# Patient Record
Sex: MALE | Race: WHITE | NOT HISPANIC OR LATINO | Employment: FULL TIME | URBAN - METROPOLITAN AREA
[De-identification: names, ages, dates, MRNs, and addresses within clinical notes are randomized per-mention and may not be internally consistent; named-entity substitution may affect disease eponyms.]

---

## 2020-10-13 ENCOUNTER — OCCUPATIONAL HEALTH (OUTPATIENT)
Dept: URGENT CARE | Facility: CLINIC | Age: 40
End: 2020-10-13

## 2020-10-13 DIAGNOSIS — Z02.1 ENCOUNTER FOR PRE-EMPLOYMENT HEALTH SCREENING EXAMINATION: Primary | ICD-10-CM

## 2020-10-13 DIAGNOSIS — Z00.00 PHYSICAL EXAM: ICD-10-CM

## 2020-10-13 DIAGNOSIS — Z02.83 ENCOUNTER FOR DRUG SCREENING: ICD-10-CM

## 2020-10-13 LAB
CTP QC/QA: YES
POC 10 PANEL DRUG SCREEN: ABNORMAL

## 2020-10-13 PROCEDURE — 99499 UNLISTED E&M SERVICE: CPT | Mod: S$GLB,,, | Performed by: PHYSICIAN ASSISTANT

## 2020-10-13 PROCEDURE — 80305 DRUG TEST PRSMV DIR OPT OBS: CPT | Mod: QW,S$GLB,, | Performed by: PHYSICIAN ASSISTANT

## 2020-10-13 PROCEDURE — 80305 POCT RAPID DRUG SCREEN 10 PANEL: ICD-10-PCS | Mod: QW,S$GLB,, | Performed by: PHYSICIAN ASSISTANT

## 2020-10-13 PROCEDURE — 80305 DRUG TEST PRSMV DIR OPT OBS: CPT | Mod: S$GLB,,, | Performed by: PHYSICIAN ASSISTANT

## 2020-10-13 PROCEDURE — 99499 PHYSICAL, BASIC COMPLEXITY: ICD-10-PCS | Mod: S$GLB,,, | Performed by: PHYSICIAN ASSISTANT

## 2020-10-13 RX ORDER — BUPROPION HYDROCHLORIDE 300 MG/1
TABLET ORAL
COMMUNITY
Start: 2020-08-26 | End: 2021-08-05

## 2020-10-13 RX ORDER — METOPROLOL SUCCINATE 25 MG/1
TABLET, EXTENDED RELEASE ORAL
COMMUNITY
Start: 2020-07-06 | End: 2021-08-05

## 2020-10-13 RX ORDER — TRAMADOL HYDROCHLORIDE AND ACETAMINOPHEN 37.5; 325 MG/1; MG/1
TABLET, FILM COATED ORAL
COMMUNITY
Start: 2020-07-06 | End: 2021-08-05

## 2020-10-13 RX ORDER — MIRTAZAPINE 15 MG/1
TABLET, FILM COATED ORAL
COMMUNITY
Start: 2020-08-20 | End: 2021-08-05

## 2021-02-26 ENCOUNTER — HOSPITAL ENCOUNTER (EMERGENCY)
Facility: HOSPITAL | Age: 41
Discharge: LEFT BEFORE DISCHARGE | End: 2021-02-26
Payer: MEDICAID

## 2021-02-26 VITALS
HEIGHT: 73 IN | HEART RATE: 102 BPM | RESPIRATION RATE: 18 BRPM | DIASTOLIC BLOOD PRESSURE: 83 MMHG | SYSTOLIC BLOOD PRESSURE: 180 MMHG | TEMPERATURE: 98 F | OXYGEN SATURATION: 96 % | BODY MASS INDEX: 35.78 KG/M2 | WEIGHT: 270 LBS

## 2021-02-26 PROCEDURE — 99283 EMERGENCY DEPT VISIT LOW MDM: CPT | Mod: 25

## 2021-05-12 ENCOUNTER — PATIENT MESSAGE (OUTPATIENT)
Dept: RESEARCH | Facility: HOSPITAL | Age: 41
End: 2021-05-12

## 2024-06-20 ENCOUNTER — HOSPITAL ENCOUNTER (EMERGENCY)
Facility: HOSPITAL | Age: 44
Discharge: HOME | End: 2024-06-20
Attending: EMERGENCY MEDICINE
Payer: MEDICAID

## 2024-06-20 VITALS
HEIGHT: 73 IN | HEART RATE: 80 BPM | SYSTOLIC BLOOD PRESSURE: 141 MMHG | WEIGHT: 270 LBS | BODY MASS INDEX: 35.78 KG/M2 | DIASTOLIC BLOOD PRESSURE: 90 MMHG | RESPIRATION RATE: 18 BRPM | TEMPERATURE: 98 F | OXYGEN SATURATION: 98 %

## 2024-06-20 DIAGNOSIS — M54.12 CERVICAL RADICULOPATHY: Primary | ICD-10-CM

## 2024-06-20 DIAGNOSIS — R07.9 CHEST PAIN: ICD-10-CM

## 2024-06-20 LAB
ALBUMIN SERPL BCP-MCNC: 4.3 G/DL (ref 3.5–5.2)
ALP SERPL-CCNC: 54 U/L (ref 55–135)
ALT SERPL W/O P-5'-P-CCNC: 31 U/L (ref 10–44)
ANION GAP SERPL CALC-SCNC: 7 MMOL/L (ref 8–16)
AST SERPL-CCNC: 23 U/L (ref 10–40)
BASOPHILS # BLD AUTO: 0.1 K/UL (ref 0–0.2)
BASOPHILS NFR BLD: 1 % (ref 0–1.9)
BILIRUB SERPL-MCNC: 0.5 MG/DL (ref 0.1–1)
BNP SERPL-MCNC: 31 PG/ML (ref 0–99)
BUN SERPL-MCNC: 15 MG/DL (ref 6–20)
CALCIUM SERPL-MCNC: 9.6 MG/DL (ref 8.7–10.5)
CHLORIDE SERPL-SCNC: 106 MMOL/L (ref 95–110)
CO2 SERPL-SCNC: 24 MMOL/L (ref 23–29)
CREAT SERPL-MCNC: 1.1 MG/DL (ref 0.5–1.4)
DIFFERENTIAL METHOD BLD: NORMAL
EOSINOPHIL # BLD AUTO: 0.4 K/UL (ref 0–0.5)
EOSINOPHIL NFR BLD: 3.4 % (ref 0–8)
ERYTHROCYTE [DISTWIDTH] IN BLOOD BY AUTOMATED COUNT: 13.5 % (ref 11.5–14.5)
EST. GFR  (NO RACE VARIABLE): >60 ML/MIN/1.73 M^2
GLUCOSE SERPL-MCNC: 147 MG/DL (ref 70–110)
HCT VFR BLD AUTO: 47.1 % (ref 40–54)
HGB BLD-MCNC: 16 G/DL (ref 14–18)
IMM GRANULOCYTES # BLD AUTO: 0.04 K/UL (ref 0–0.04)
IMM GRANULOCYTES NFR BLD AUTO: 0.4 % (ref 0–0.5)
LYMPHOCYTES # BLD AUTO: 3 K/UL (ref 1–4.8)
LYMPHOCYTES NFR BLD: 29.6 % (ref 18–48)
MAGNESIUM SERPL-MCNC: 1.9 MG/DL (ref 1.6–2.6)
MCH RBC QN AUTO: 30.2 PG (ref 27–31)
MCHC RBC AUTO-ENTMCNC: 34 G/DL (ref 32–36)
MCV RBC AUTO: 89 FL (ref 82–98)
MONOCYTES # BLD AUTO: 0.8 K/UL (ref 0.3–1)
MONOCYTES NFR BLD: 8.2 % (ref 4–15)
NEUTROPHILS # BLD AUTO: 5.9 K/UL (ref 1.8–7.7)
NEUTROPHILS NFR BLD: 57.4 % (ref 38–73)
NRBC BLD-RTO: 0 /100 WBC
PLATELET # BLD AUTO: 233 K/UL (ref 150–450)
PMV BLD AUTO: 10.4 FL (ref 9.2–12.9)
POTASSIUM SERPL-SCNC: 4.4 MMOL/L (ref 3.5–5.1)
PROT SERPL-MCNC: 7.7 G/DL (ref 6–8.4)
RBC # BLD AUTO: 5.29 M/UL (ref 4.6–6.2)
SODIUM SERPL-SCNC: 137 MMOL/L (ref 136–145)
TROPONIN I SERPL HS-MCNC: 6.2 PG/ML (ref 0–14.9)
TROPONIN I SERPL HS-MCNC: 6.9 PG/ML (ref 0–14.9)
WBC # BLD AUTO: 10.25 K/UL (ref 3.9–12.7)

## 2024-06-20 PROCEDURE — 99285 EMERGENCY DEPT VISIT HI MDM: CPT | Mod: 25

## 2024-06-20 PROCEDURE — 85025 COMPLETE CBC W/AUTO DIFF WBC: CPT

## 2024-06-20 PROCEDURE — 25500020 PHARM REV CODE 255: Performed by: EMERGENCY MEDICINE

## 2024-06-20 PROCEDURE — 83735 ASSAY OF MAGNESIUM: CPT

## 2024-06-20 PROCEDURE — 80053 COMPREHEN METABOLIC PANEL: CPT

## 2024-06-20 PROCEDURE — 83880 ASSAY OF NATRIURETIC PEPTIDE: CPT

## 2024-06-20 PROCEDURE — 84484 ASSAY OF TROPONIN QUANT: CPT | Mod: 91

## 2024-06-20 PROCEDURE — 93005 ELECTROCARDIOGRAM TRACING: CPT | Performed by: INTERNAL MEDICINE

## 2024-06-20 PROCEDURE — 93010 ELECTROCARDIOGRAM REPORT: CPT | Mod: ,,, | Performed by: INTERNAL MEDICINE

## 2024-06-20 RX ORDER — METHOCARBAMOL 750 MG/1
1500 TABLET, FILM COATED ORAL 3 TIMES DAILY
Qty: 60 TABLET | Refills: 0 | Status: SHIPPED | OUTPATIENT
Start: 2024-06-20 | End: 2024-06-30

## 2024-06-20 RX ORDER — NAPROXEN 500 MG/1
500 TABLET ORAL 2 TIMES DAILY WITH MEALS
Qty: 60 TABLET | Refills: 0 | Status: SHIPPED | OUTPATIENT
Start: 2024-06-20

## 2024-06-20 RX ORDER — METHYLPREDNISOLONE 4 MG/1
TABLET ORAL
Qty: 1 EACH | Refills: 0 | Status: SHIPPED | OUTPATIENT
Start: 2024-06-20 | End: 2024-07-11

## 2024-06-20 RX ORDER — HYDROCODONE BITARTRATE AND ACETAMINOPHEN 5; 325 MG/1; MG/1
1 TABLET ORAL EVERY 6 HOURS PRN
Qty: 12 TABLET | Refills: 0 | Status: SHIPPED | OUTPATIENT
Start: 2024-06-20

## 2024-06-20 RX ADMIN — IOHEXOL 100 ML: 350 INJECTION, SOLUTION INTRAVENOUS at 01:06

## 2024-06-20 NOTE — ED PROVIDER NOTES
Encounter Date: 6/20/2024       History     Chief Complaint   Patient presents with    Chest Pain     L sided w/ radiation to neck x a few days.    Shortness of Breath     W/ metallic taste in mouth      HPI 44-year-old man who presents emergency department with complaints left-sided arm numbness worsening since Saturday with associated left upper extremity weakness worsening since Monday.  He also has been experiencing extreme fatigue since Saturday and endorses metallic taste in his mouth as well as shortness of breath.  He complains of neck pain worse on the left and denies any fever.  No trauma.  Review of patient's allergies indicates:  No Known Allergies  Past Medical History:   Diagnosis Date    HTN (hypertension)      No past surgical history on file.  No family history on file.  Social History     Tobacco Use    Smoking status: Every Day     Types: Cigars    Smokeless tobacco: Never    Tobacco comments:     6 per day   Substance Use Topics    Alcohol use: Never    Drug use: Never     Review of Systems   Constitutional:  Negative for fever.   HENT:  Negative for sore throat.         Change in taste.   Respiratory:  Positive for shortness of breath.    Cardiovascular:  Negative for chest pain.   Gastrointestinal:  Negative for nausea.   Genitourinary:  Negative for dysuria.   Musculoskeletal:  Positive for neck pain. Negative for back pain.   Skin:  Negative for rash.   Neurological:  Positive for weakness and numbness.   Hematological:  Does not bruise/bleed easily.       Physical Exam     Initial Vitals [06/20/24 0952]   BP Pulse Resp Temp SpO2   (!) 167/111 99 20 97.8 °F (36.6 °C) 97 %      MAP       --         Physical Exam    Constitutional: Vital signs are normal. He appears well-developed and well-nourished.  Non-toxic appearance. No distress.   HENT:   Head: Normocephalic and atraumatic.   Eyes: EOM are normal. Pupils are equal, round, and reactive to light.   Visual fields intact.   Neck: Neck  supple. No JVD present.       Normal range of motion.  Cardiovascular:  Normal rate, regular rhythm, normal heart sounds and intact distal pulses.     Exam reveals no gallop and no friction rub.       No murmur heard.  Pulmonary/Chest: Breath sounds normal. He has no wheezes. He has no rhonchi. He has no rales.   Abdominal: Abdomen is soft. Bowel sounds are normal. There is no abdominal tenderness. There is no rebound and no guarding.   Musculoskeletal:         General: Normal range of motion.      Cervical back: Normal range of motion and neck supple. No rigidity. Spinous process tenderness and muscular tenderness present.     Neurological: He is alert and oriented to person, place, and time. He has normal reflexes. A sensory deficit (Numbness in the left hand and left upper arm.) is present. No cranial nerve deficit. He exhibits normal muscle tone. Coordination normal. GCS eye subscore is 4. GCS verbal subscore is 5. GCS motor subscore is 6.   4/5 strength in left elbow flexion, extension and left hand  strength compared to the right.   Skin: Skin is warm and dry.   Psychiatric: He has a normal mood and affect. His speech is normal and behavior is normal. He is not actively hallucinating.         ED Course   Procedures  Labs Reviewed   COMPREHENSIVE METABOLIC PANEL - Abnormal; Notable for the following components:       Result Value    Glucose 147 (*)     Alkaline Phosphatase 54 (*)     Anion Gap 7 (*)     All other components within normal limits   MAGNESIUM   CBC W/ AUTO DIFFERENTIAL   TROPONIN I HIGH SENSITIVITY   B-TYPE NATRIURETIC PEPTIDE        ECG Results              EKG 12-lead (In process)        Collection Time Result Time QRS Duration [facility] QTC Calculation    06/20/24 09:59:22 06/20/24 10:08:45 100 421                     In process by Interface, Lab In [facility] (06/20/24 10:08:53)                   Narrative:    Test Reason : R07.9,    Vent. Rate : 087 BPM     Atrial Rate : 087 BPM     P-R Int : 120  ms          QRS Dur : 100 ms      QT Int : 350 ms       P-R-T Axes : 083 075 078 degrees     QTc Int : 421 ms    Normal sinus rhythm with sinus arrhythmia  Incomplete right bundle branch block  Borderline Abnormal ECG  When compared with ECG of 26-JUN-2020 10:18,  Incomplete right bundle branch block is now Present    Referred By:             Confirmed By:                                   Imaging Results              X-Ray Chest AP Portable (Final result)  Result time 06/20/24 10:27:23      Final result by Gallo Sifuentes MD (06/20/24 10:27:23)                   Impression:      No acute cardiac or pulmonary process.      Electronically signed by: Gallo Sifuentes  Date:    06/20/2024  Time:    10:27               Narrative:    CLINICAL HISTORY:  ([id number])43 y/o  (1980) M    Chest Pain;    TECHNIQUE:  (A#[id number], exam time 6/20/2024 10:23)    XR CHEST AP PORTABLE [id number]    COMPARISON:  Radiograph from 06/24/2020    FINDINGS:  The lungs are clear. Costophrenic angles are seen without effusion. No pneumothorax is identified. The heart is normal in size. The mediastinum is within normal limits. Osseous structures show degenerative changes in the spine. The visualized upper abdomen is unremarkable.                                       Medications - No data to display  Medical Decision Making  Amount and/or Complexity of Data Reviewed  Radiology: ordered.                                      Clinical Impression:  Final diagnoses:  [R07.9] Chest pain                 Garret Toussaint MD  06/25/24 2216

## 2024-06-22 LAB
OHS QRS DURATION: 100 MS
OHS QTC CALCULATION: 421 MS

## 2024-07-22 PROBLEM — M48.02 MYELOPATHY CONCURRENT WITH AND DUE TO SPINAL STENOSIS OF CERVICAL REGION: Status: ACTIVE | Noted: 2024-07-22

## 2024-07-22 PROBLEM — L30.9 ECZEMA: Status: ACTIVE | Noted: 2024-07-22

## 2024-07-22 PROBLEM — N20.0 KIDNEY CALCULI: Status: ACTIVE | Noted: 2024-07-22

## 2024-07-22 PROBLEM — Z72.0 SMOKING TRYING TO QUIT: Status: ACTIVE | Noted: 2024-07-22

## 2024-07-22 PROBLEM — G99.2 MYELOPATHY CONCURRENT WITH AND DUE TO SPINAL STENOSIS OF CERVICAL REGION: Status: ACTIVE | Noted: 2024-07-22

## 2024-07-22 PROBLEM — D13.91 POLYPOSIS COLI: Status: ACTIVE | Noted: 2024-07-22

## 2024-07-22 PROBLEM — R03.0 ELEVATED BP WITHOUT DIAGNOSIS OF HYPERTENSION: Status: ACTIVE | Noted: 2024-07-22

## 2024-10-08 PROBLEM — M79.10 MYALGIA: Status: ACTIVE | Noted: 2024-10-08

## 2024-12-27 ENCOUNTER — CLINICAL SUPPORT (OUTPATIENT)
Dept: REHABILITATION | Facility: HOSPITAL | Age: 44
End: 2024-12-27
Attending: INTERNAL MEDICINE
Payer: MEDICAID

## 2024-12-27 DIAGNOSIS — M25.672 ANKLE STIFFNESS, LEFT: Primary | ICD-10-CM

## 2024-12-27 DIAGNOSIS — G89.29 CHRONIC BILATERAL LOW BACK PAIN WITHOUT SCIATICA: ICD-10-CM

## 2024-12-27 DIAGNOSIS — M54.2 NECK PAIN: ICD-10-CM

## 2024-12-27 DIAGNOSIS — M54.50 CHRONIC BILATERAL LOW BACK PAIN WITHOUT SCIATICA: ICD-10-CM

## 2024-12-27 DIAGNOSIS — M79.10 MYALGIA: ICD-10-CM

## 2024-12-27 PROCEDURE — 97162 PT EVAL MOD COMPLEX 30 MIN: CPT | Mod: PN

## 2024-12-27 NOTE — PROGRESS NOTES
[facility]  Physical Therapy Initial Evaluation / Plan Of Care    Name: Sixto Denis  Clinic Number: [id number]    Therapy Diagnosis:   Encounter Diagnosis   Name Primary?    Myalgia      Physician: Jakub Barker MD    Physician Orders: PT Eval and Treat   Medical Diagnosis: M79.10 (ICD-10-CM) - Myalgia  Evaluation Date: 12/27/2024  Authorization period Expiration: 12/31/2025  Plan of Care Expiration: 3/14/2025  Progress Note Due: 2/7/2025  Visit #/Visits authorized: 1/ 1   FOTO: 1/ 3       Precautions: Standard  Date of Surgery: NA      Time In: 1130  Time Out: 1215  Total Appointment Time: 45 minutes  Total Billable Time: 45 minutes  SUBJECTIVE       Past Medical History:   Diagnosis Date    Bulging lumbar disc     HTN (hypertension)     Kidney stones      Sixto Denis  has a past surgical history that includes Colonoscopy w/ biopsies and polypectomy (11/2020).    Sixto has a current medication list which includes the following prescription(s): alprazolam, buspirone, hydrocodone-acetaminophen, hydrocortisone, methocarbamol, mometasone 0.1%, naproxen, ondansetron, and tamsulosin.    Review of patient's allergies indicates:  No Known Allergies     Prior Therapy: yes for current condition, in [place] but fell off the schedule   Social History: Patient lives in a single story home with 0 steps to enter   Occupation:    Prior Level of Function: independent with pain  Current Level of Function: same    Pain:  Current 7/10, worst 9/10, best 4/10   Location: lower cervical to upper thoracic with radiating symptoms into both shoulders, right more than left  Description: Aching, Tingling, and Sharp  Aggravating Factors: weather, climbing ladders, overhead reaching  Easing Factors:  attempted rest, positional changes, ice, heat, topicals, and Rx meds with no relief       Onset/COLLEEN: thinks it stems from an old ankle injury and/or chiropractic adjustment    History of current condition -  Sixto reports: 1 year history of cervical pain following chiropractic adjustment. Patient states about 7 years ago he fell from scaffolding and fractured the left ankle. He states he had no surgery and only 1 x-ray following the ankle injury. Says he's never had treatment on the ankle and he feels like all of his issues are stemming from his ankle. He says he did have therapy in [place] recently to address the neck, but states they wouldn't do anything else other than the neck. He says he did have dry needling and traction to alleviate neck pain and is interested in continuing that treatment if indicated.     Pts goals: to improve quality of mobility    OBJECTIVE     Cervical Range of Motion:    Degrees Pain   Flexion 19    Extension 50    Left Rotation 50    Right Rotation 40    Left Side Bending 21    Right Side Bending 15       Shoulder Range of Motion:   Left:  WFL   Right: WFL, limited functional IR to posterolateral right hip    Positive Cervical compression test bilaterally    Strength:   Left Right   DNF 3/5    Upper trap 5/5 4/5   Mid trap 4/5 4/5   Lower trap 4/5 4/5    105 105     Upper Extremity Strength   Left Right   Shoulder flexion: 5/5 5/5   Shoulder Abduction: 5/5 5/5   Shoulder ER 5/5 5/5   Shoulder IR 5/5 5/5   Elbow flexion: 5/5 5/5   Elbow extension: 5/5 5/5   Wrist flexion: 5/5 5/5   Wrist extension: 5/5 5/5       Lumbar Range of Motion:    % of Normal Range Pain   Flexion 100    Extension 50    Left Side Bending 100    Right Side Bending 100    Left rotation 100    Right Rotation 100       Lower Extremity Strength   Left Right   Knee extension: 5/5 5/5   Knee flexion: 5/5 5/5   Hip flexion: 4/5 4/5   Hip extension:  4/5 4/5   Hip abduction: 4+/5 4+/5   Hip adduction: 5/5 5/5   Ankle dorsiflexion: 5/5 5/5     Special Tests:    Straight Leg Raise Negative   Slump Negative   Quadrant Negative   Femoral nerve test Negative       Ankle Range of Motion: AROM   Ankle Left Right   Dorsiflexion  0 15   Plantarflexion 40 45   Inversion 18 30   Eversion 8 20     Ankle Range of Motion: PROM   Ankle Left   Dorsiflexion 7   Plantarflexion 45   Inversion 23   Eversion 11     Strength:  Ankle Left Right   Dorsiflexion 5/5 5/5   Plantarflexion 3-/5 4/5   Inversion 3-/5 5/5   Eversion 3-/5 5/5         Treatment     EVAL ONLY THIS DATE      Patient Education and Home Exercises     Home Exercises and Patient Education Provided    Education provided re:   - progress towards goals   - role of therapy in multi - disciplinary team, goals for therapy  Pt educated on condition, POC, and expectations in therapy.  No spiritual or educational barriers to learning provided    Home exercises:  Pt will be provided HEP during course of treatment with progressions as appropriate. Pt was advised to perform these exercises free of pain, and to stop performing them if pain occurs.   Sixto demonstrated good  understanding of the education provided.     Functional Limitations Reports  Tool: FOTO  Intake: 47  Predicted: 56    ASSESSMENT      Sixto is a 44 y.o. male referred to outpatient physical therapy with a medical diagnosis of M79.10 (ICD-10-CM) - Myalgia, and presents to PT with several complaints today. Patient does have decreased A/PROM in the left ankle as well as decreased strength associated. MD is ordering an X-ray of the ankle as this is an old injury that may have some natural fusions limiting range. Patient's lumbar range is functional and his bilateral hip and knee strength is within functional limits with no neurotension noted during examination. Cervical range is limited in all planes and patient does present with positive compression tests indicating likely nerve or disc involvement. Patient to benefit from therapy with a focus first on the cervical spine and incorporating left ankle as appropriate pending imaging. Patient to benefit from skilled therapy to prevent further decline in functional status. All questions  and concerns addressed.  Patient demonstrates limitations as described in the problem list. Pt will benefit from physcial therapy services in order to maximize pain free mobility of cervical spine and bilateral upper extremities as well as maximize function in left ankle to improve quality of gait. The following goals were discussed with the patient and patient is in agreement with them as to be addressed in the treatment plan.     Pt prognosis is Good.     Pt's spiritual, cultural and educational needs considered and pt agreeable to plan of care and goals as stated below:     Anticipated Barriers for therapy: none identified at time of evaluation for cervical spine, chronicity of left ankle symptoms    Plan of care discussed with patient: Yes    Pt will benefit from skilled outpatient Physical Therapy to address the deficits stated above and in the chart below, provide pt/family education, and to maximize pt's level of independence.     Medical necessity is demonstrated by the following IMPAIRMENTS/PROBLEM LIST:    weakness, impaired functional mobility, gait instability, decreased upper extremity function, decreased lower extremity function, pain, decreased ROM, impaired joint extensibility, and impaired muscle length      Medical Necessity is demonstrated by the following  History  Co-morbidities and personal factors that may impact the plan of care Co-morbidities/Personal Factors    0= low, 1-2 = mod, 3+ = high   high   Examination  Body Structures and Functions, activity limitations and participation restrictions that may impact the plan of care Body Regions/Body Systems    Participation Restrictions    Activity limitations  Mobility/Self care/Domestic Life/Community and Social Life    1-2 = low, 3+ = mod, 4+ = high       moderate   Clinical Presentation evolving clinical presentation with changing clinical characteristics  Stable/uncomplicated = low, evolving/changing = mod,  Unstable/unpredictable = high  moderate   Decision Making/ Complexity Score: moderate           GOALS     Short Term Goals: 3 weeks  Demonstrate improvement in recent symptoms to progress toward long term goals  Correct postural deviations in sitting and standing to decrease pain and promote postural awareness for injury prevention.  Improve AROM of cervical spine to 50 deg rotation bilaterally, 50 deg extension, 50 deg flexion or better  50% decrease in radicular symptoms in upper extremities coming from the cervical spine  Demonstrate compliance with initial exercise program    Long Term Goals: 6 weeks  Perform all dressing, grooming, and hygiene tasks without limitation from neck or arm pain  Perform 75% of work duties with minimal to no limitation due to neck or arm pain, no work task greater than moderate limitation  Perform all usual household and yard tasks with minimal to no limitation  Ascend/descend flight of stairs with no limitation, reciprocal pattern, minimal compensation due to left ankle  Ascend/descend ladder with minimal limitation due to left ankle  Demonstrate independence with home exercise program to maintain gains made in therapy.      PLAN      Certification Period: 12/27/2024 to 3/14/2025.    Outpatient Physical Therapy 2 times weekly for 6 weeks to include the following interventions: patient education, Cervical/Lumbar Traction, Electrical Stimulation PRN, Gait Training, Manual Therapy, Moist Heat/ Ice, Neuromuscular Re-ed, Patient Education, Self Care, Therapeutic Activities, Therapeutic Exercise, Ultrasound, and dry needling .   Pt may be seen by PTA as part of the rehabilitation team.     I certify the need for these services furnished under this plan of treatment and while under my care.    Melanie Curry, PT        Attestation:   I have seen the patient, reviewed the therapist's plan of care, and I agree with the plan of care.   I certify the need for these services furnished under this plan of treatment and while  under my care.         _______________            ________                                               _____________________  Physician/Referring Practitioner                                                            Date of Signature

## 2024-12-27 NOTE — PLAN OF CARE
[facility]  Physical Therapy Initial Evaluation / Plan Of Care    Name: Sixto Denis  Clinic Number: [id number]    Therapy Diagnosis:   Encounter Diagnosis   Name Primary?    Myalgia      Physician: Jakub Barker MD    Physician Orders: PT Eval and Treat   Medical Diagnosis: M79.10 (ICD-10-CM) - Myalgia  Evaluation Date: 12/27/2024  Authorization period Expiration: 12/31/2025  Plan of Care Expiration: 3/14/2025  Progress Note Due: 2/7/2025  Visit #/Visits authorized: 1/ 1   FOTO: 1/ 3       Precautions: Standard  Date of Surgery: NA      Time In: 1130  Time Out: 1215  Total Appointment Time: 45 minutes  Total Billable Time: 45 minutes  SUBJECTIVE       Past Medical History:   Diagnosis Date    Bulging lumbar disc     HTN (hypertension)     Kidney stones      Sixto Denis  has a past surgical history that includes Colonoscopy w/ biopsies and polypectomy (11/2020).    Sixto has a current medication list which includes the following prescription(s): alprazolam, buspirone, hydrocodone-acetaminophen, hydrocortisone, methocarbamol, mometasone 0.1%, naproxen, ondansetron, and tamsulosin.    Review of patient's allergies indicates:  No Known Allergies     Prior Therapy: yes for current condition, in [place] but fell off the schedule   Social History: Patient lives in a single story home with 0 steps to enter   Occupation:    Prior Level of Function: independent with pain  Current Level of Function: same    Pain:  Current 7/10, worst 9/10, best 4/10   Location: lower cervical to upper thoracic with radiating symptoms into both shoulders, right more than left  Description: Aching, Tingling, and Sharp  Aggravating Factors: weather, climbing ladders, overhead reaching  Easing Factors: attempted rest, positional changes, ice, heat, topicals, and Rx meds with no relief       Onset/COLLEEN: thinks it stems from an old ankle injury and/or chiropractic adjustment    History of current condition -  Sixto reports: 1 year history of cervical pain following chiropractic adjustment. Patient states about 7 years ago he fell from scaffolding and fractured the left ankle. He states he had no surgery and only 1 x-ray following the ankle injury. Says he's never had treatment on the ankle and he feels like all of his issues are stemming from his ankle. He says he did have therapy in [place] recently to address the neck, but states they wouldn't do anything else other than the neck. He says he did have dry needling and traction to alleviate neck pain and is interested in continuing that treatment if indicated.     Pts goals: to improve quality of mobility    OBJECTIVE     Cervical Range of Motion:    Degrees Pain   Flexion 19    Extension 50    Left Rotation 50    Right Rotation 40    Left Side Bending 21    Right Side Bending 15       Shoulder Range of Motion:   Left:  WFL   Right: WFL, limited functional IR to posterolateral right hip    Positive Cervical compression test bilaterally    Strength:   Left Right   DNF 3/5    Upper trap 5/5 4/5   Mid trap 4/5 4/5   Lower trap 4/5 4/5    105 105     Upper Extremity Strength   Left Right   Shoulder flexion: 5/5 5/5   Shoulder Abduction: 5/5 5/5   Shoulder ER 5/5 5/5   Shoulder IR 5/5 5/5   Elbow flexion: 5/5 5/5   Elbow extension: 5/5 5/5   Wrist flexion: 5/5 5/5   Wrist extension: 5/5 5/5       Lumbar Range of Motion:    % of Normal Range Pain   Flexion 100    Extension 50    Left Side Bending 100    Right Side Bending 100    Left rotation 100    Right Rotation 100       Lower Extremity Strength   Left Right   Knee extension: 5/5 5/5   Knee flexion: 5/5 5/5   Hip flexion: 4/5 4/5   Hip extension:  4/5 4/5   Hip abduction: 4+/5 4+/5   Hip adduction: 5/5 5/5   Ankle dorsiflexion: 5/5 5/5     Special Tests:    Straight Leg Raise Negative   Slump Negative   Quadrant Negative   Femoral nerve test Negative       Ankle Range of Motion: AROM   Ankle Left Right   Dorsiflexion  0 15   Plantarflexion 40 45   Inversion 18 30   Eversion 8 20     Ankle Range of Motion: PROM   Ankle Left   Dorsiflexion 7   Plantarflexion 45   Inversion 23   Eversion 11     Strength:  Ankle Left Right   Dorsiflexion 5/5 5/5   Plantarflexion 3-/5 4/5   Inversion 3-/5 5/5   Eversion 3-/5 5/5         Treatment     EVAL ONLY THIS DATE      Patient Education and Home Exercises     Home Exercises and Patient Education Provided    Education provided re:   - progress towards goals   - role of therapy in multi - disciplinary team, goals for therapy  Pt educated on condition, POC, and expectations in therapy.  No spiritual or educational barriers to learning provided    Home exercises:  Pt will be provided HEP during course of treatment with progressions as appropriate. Pt was advised to perform these exercises free of pain, and to stop performing them if pain occurs.   Sixto demonstrated good  understanding of the education provided.     Functional Limitations Reports  Tool: FOTO  Intake: 47  Predicted: 56    ASSESSMENT      Sixto is a 44 y.o. male referred to outpatient physical therapy with a medical diagnosis of M79.10 (ICD-10-CM) - Myalgia, and presents to PT with several complaints today. Patient does have decreased A/PROM in the left ankle as well as decreased strength associated. MD is ordering an X-ray of the ankle as this is an old injury that may have some natural fusions limiting range. Patient's lumbar range is functional and his bilateral hip and knee strength is within functional limits with no neurotension noted during examination. Cervical range is limited in all planes and patient does present with positive compression tests indicating likely nerve or disc involvement. Patient to benefit from therapy with a focus first on the cervical spine and incorporating left ankle as appropriate pending imaging. Patient to benefit from skilled therapy to prevent further decline in functional status. All questions  and concerns addressed.  Patient demonstrates limitations as described in the problem list. Pt will benefit from physcial therapy services in order to maximize pain free mobility of cervical spine and bilateral upper extremities as well as maximize function in left ankle to improve quality of gait. The following goals were discussed with the patient and patient is in agreement with them as to be addressed in the treatment plan.     Pt prognosis is Good.     Pt's spiritual, cultural and educational needs considered and pt agreeable to plan of care and goals as stated below:     Anticipated Barriers for therapy: none identified at time of evaluation for cervical spine, chronicity of left ankle symptoms    Plan of care discussed with patient: Yes    Pt will benefit from skilled outpatient Physical Therapy to address the deficits stated above and in the chart below, provide pt/family education, and to maximize pt's level of independence.     Medical necessity is demonstrated by the following IMPAIRMENTS/PROBLEM LIST:    weakness, impaired functional mobility, gait instability, decreased upper extremity function, decreased lower extremity function, pain, decreased ROM, impaired joint extensibility, and impaired muscle length      Medical Necessity is demonstrated by the following  History  Co-morbidities and personal factors that may impact the plan of care Co-morbidities/Personal Factors    0= low, 1-2 = mod, 3+ = high   high   Examination  Body Structures and Functions, activity limitations and participation restrictions that may impact the plan of care Body Regions/Body Systems    Participation Restrictions    Activity limitations  Mobility/Self care/Domestic Life/Community and Social Life    1-2 = low, 3+ = mod, 4+ = high       moderate   Clinical Presentation evolving clinical presentation with changing clinical characteristics  Stable/uncomplicated = low, evolving/changing = mod,  Unstable/unpredictable = high  moderate   Decision Making/ Complexity Score: moderate           GOALS     Short Term Goals: 3 weeks  Demonstrate improvement in recent symptoms to progress toward long term goals  Correct postural deviations in sitting and standing to decrease pain and promote postural awareness for injury prevention.  Improve AROM of cervical spine to 50 deg rotation bilaterally, 50 deg extension, 50 deg flexion or better  50% decrease in radicular symptoms in upper extremities coming from the cervical spine  Demonstrate compliance with initial exercise program    Long Term Goals: 6 weeks  Perform all dressing, grooming, and hygiene tasks without limitation from neck or arm pain  Perform 75% of work duties with minimal to no limitation due to neck or arm pain, no work task greater than moderate limitation  Perform all usual household and yard tasks with minimal to no limitation  Ascend/descend flight of stairs with no limitation, reciprocal pattern, minimal compensation due to left ankle  Ascend/descend ladder with minimal limitation due to left ankle  Demonstrate independence with home exercise program to maintain gains made in therapy.      PLAN      Certification Period: 12/27/2024 to 3/14/2025.    Outpatient Physical Therapy 2 times weekly for 6 weeks to include the following interventions: patient education, Cervical/Lumbar Traction, Electrical Stimulation PRN, Gait Training, Manual Therapy, Moist Heat/ Ice, Neuromuscular Re-ed, Patient Education, Self Care, Therapeutic Activities, Therapeutic Exercise, Ultrasound, and dry needling.   Pt may be seen by PTA as part of the rehabilitation team.     I certify the need for these services furnished under this plan of treatment and while under my care.    Melanie Curry, PT        Attestation:   I have seen the patient, reviewed the therapist's plan of care, and I agree with the plan of care.   I certify the need for these services furnished under this plan of treatment and while  under my care.         _______________            ________                                               _____________________  Physician/Referring Practitioner                                                            Date of Signature

## 2024-12-30 NOTE — PROGRESS NOTES
[facility]   Physical Therapy Treatment Note     Name: Sixto Denis  Clinic Number: [id number]    Therapy Diagnosis:   Encounter Diagnoses   Name Primary?    Ankle stiffness, left Yes    Chronic bilateral low back pain without sciatica     Neck pain      Physician: Jakub Barker MD    Visit Date: 12/31/2024    Physician Orders: PT Eval and Treat   Medical Diagnosis: M79.10 (ICD-10-CM) - Myalgia  Evaluation Date: 12/27/2024  Authorization period Expiration: 12/31/2025  Plan of Care Expiration: 3/14/2025  Progress Note Due: 2/7/2025  Visit #/Visits authorized: 2/ 1 pending  FOTO: 1/ 3         Precautions: Standard  Date of Surgery: NA    PTA Visit #: 0/5       Time In: 3:20  Time Out: 4:05  Total Billable Time: 45 minutes    SUBJECTIVE     Pt reports: CC is neck pain  .  Pt was not provided HEP   Response to previous treatment: felt ok   Functional change: none yet     Pain: 7/10 to start at end no c/o increased pain   Location: bilateral upper trap     OBJECTIVE     Cervical ROM: (measured in degrees sitting)  12-31-24  - flexion -  25                    - extension -  45                        - right side bending -  30        - left side bending -  30      - right rotation - 40  - left rotation - 30     Full shoulder ROM    Pt with slight scap instability with elevation and abd     Treatment     Sixto received the treatments listed below:      therapeutic exercises to develop strength, endurance, ROM, flexibility, posture, and core stabilization for 45 minutes including:  Cervical mob with side glide and P/A, grade 1-3, gentle distraction, suboccipital release  STM neck paraspinals, upper trap, levator scapulae, suboccipitals     Instructed pt in self STM to suboccipitals with tennis balls   \  CROM SB x 10 3 count hold  CROM rot x 10 3 count hold   Scap squeeze x 10   Pulldown with retraction with YTB x 10  Retraction with YTB x 10  ER with YTB x 10  Provided pt YTB for home  use    Patient Education and Home Exercises     Home Exercises Provided and Patient Education Provided     Education provided:   - posture awareness and need for scap stab  - HEP   - stretch to point of tightness not pain   - exercise in pain free zone       Written Home Exercises Provided: yes. Exercises were reviewed and Sixto was able to demonstrate them prior to the end of the session.  Sixto demonstrated good understanding of the education provided. See EMR under Patient Instructions for exercises provided during therapy sessions    ASSESSMENT     Pt with scap instability and will benefit from strengthening  pt appears to understand need to disassociate upper trap with shoulder movement  Pt tolerated treatment well and able to progress with strengthening and stabilization without adverse effect       Sixto Is progressing well towards his goals.     Pt prognosis is Good.   Rehab potential:  Good    Pt will continue to benefit from skilled outpatient physical therapy to address the goals listed in the initial evaluation, provide pt/family education and to maximize pt's level of independence in the home and community environment.     Pt's spiritual, cultural and educational needs considered and pt agreeable to plan of care and goals.     Anticipated barriers to physical therapy: chronicity of ankle    Short Term Goals: 3 weeks  Demonstrate improvement in recent symptoms to progress toward long term goals  Correct postural deviations in sitting and standing to decrease pain and promote postural awareness for injury prevention.  Improve AROM of cervical spine to 50 deg rotation bilaterally, 50 deg extension, 50 deg flexion or better  50% decrease in radicular symptoms in upper extremities coming from the cervical spine  Demonstrate compliance with initial exercise program     Long Term Goals: 6 weeks  Perform all dressing, grooming, and hygiene tasks without limitation from neck or arm pain  Perform 75% of work  duties with minimal to no limitation due to neck or arm pain, no work task greater than moderate limitation  Perform all usual household and yard tasks with minimal to no limitation  Ascend/descend flight of stairs with no limitation, reciprocal pattern, minimal compensation due to left ankle  Ascend/descend ladder with minimal limitation due to left ankle  Demonstrate independence with home exercise program to maintain gains made in therapy.        PLAN   Continue with established plan of care towards PT goals.      Progress strength and stab as tolerated      Brittany Campo, PT

## 2024-12-31 ENCOUNTER — HOSPITAL ENCOUNTER (OUTPATIENT)
Dept: RADIOLOGY | Facility: HOSPITAL | Age: 44
Discharge: HOME | End: 2024-12-31
Attending: INTERNAL MEDICINE
Payer: MEDICAID

## 2024-12-31 ENCOUNTER — CLINICAL SUPPORT (OUTPATIENT)
Dept: REHABILITATION | Facility: HOSPITAL | Age: 44
End: 2024-12-31
Attending: INTERNAL MEDICINE
Payer: MEDICAID

## 2024-12-31 DIAGNOSIS — G89.29 CHRONIC BILATERAL LOW BACK PAIN WITHOUT SCIATICA: ICD-10-CM

## 2024-12-31 DIAGNOSIS — M25.672 ANKLE STIFFNESS, LEFT: ICD-10-CM

## 2024-12-31 DIAGNOSIS — M54.50 CHRONIC BILATERAL LOW BACK PAIN WITHOUT SCIATICA: ICD-10-CM

## 2024-12-31 DIAGNOSIS — M25.672 ANKLE STIFFNESS, LEFT: Primary | ICD-10-CM

## 2024-12-31 DIAGNOSIS — M54.2 NECK PAIN: ICD-10-CM

## 2024-12-31 PROCEDURE — 97110 THERAPEUTIC EXERCISES: CPT | Mod: PN | Performed by: PHYSICAL THERAPIST

## 2024-12-31 PROCEDURE — 73610 X-RAY EXAM OF ANKLE: CPT | Mod: TC,FY,PO,LT

## 2024-12-31 PROCEDURE — 73610 X-RAY EXAM OF ANKLE: CPT | Mod: 26,LT,, | Performed by: STUDENT IN AN ORGANIZED HEALTH CARE EDUCATION/TRAINING PROGRAM
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
## 2025-01-28 ENCOUNTER — CLINICAL SUPPORT (OUTPATIENT)
Dept: REHABILITATION | Facility: HOSPITAL | Age: 45
End: 2025-01-28
Payer: MEDICAID

## 2025-01-28 DIAGNOSIS — M54.50 CHRONIC BILATERAL LOW BACK PAIN WITHOUT SCIATICA: ICD-10-CM

## 2025-01-28 DIAGNOSIS — M25.672 ANKLE STIFFNESS, LEFT: Primary | ICD-10-CM

## 2025-01-28 DIAGNOSIS — M54.12 CERVICAL RADICULOPATHY: ICD-10-CM

## 2025-01-28 DIAGNOSIS — M54.2 NECK PAIN: ICD-10-CM

## 2025-01-28 DIAGNOSIS — G89.29 CHRONIC BILATERAL LOW BACK PAIN WITHOUT SCIATICA: ICD-10-CM

## 2025-01-28 DIAGNOSIS — M79.10 MYALGIA: ICD-10-CM

## 2025-01-28 PROCEDURE — 97110 THERAPEUTIC EXERCISES: CPT | Mod: PN | Performed by: PHYSICAL THERAPIST

## 2025-01-29 NOTE — PROGRESS NOTES
"[facility]   Physical Therapy  Treatment Note     Name: Sixto Denis  Clinic Number: [id number]    Therapy Diagnosis:   Encounter Diagnoses   Name Primary?    Myalgia Yes    Ankle stiffness, left     Chronic bilateral low back pain without sciatica     Neck pain      Physician: Jakub Barker MD    Visit Date: 1/30/2025    Physician Orders: PT Eval and Treat   Medical Diagnosis: M79.10 (ICD-10-CM) - Myalgia  Evaluation Date: 12/27/2024  Authorization period Expiration: 3-1/2025  Plan of Care Expiration: 3/14/2025  Progress Note Due: 2/27/2025  Visit #/Visits authorized: 4/ 13?   FOTO: 1/ 3         Precautions: Standard  Date of Surgery: NA    PTA Visit #: 0/5     Time In: 9:08  Time Out: 10:05  Total Billable Time: 57 minutes    SUBJECTIVE     Pt reports: stretching really helps Pt states did a lot of work with a lot of crawling and dragging   Response to previous treatment: felt ok   Functional change: none yet     Pain: 4-5/10 to start at end  after neurofloss decreased tingle 3/10 after DN no tingling  Location: along upper trap at last session top of shoulders "joints" at IE bilateral upper trap     OBJECTIVE     Cervical ROM: (measured in degrees sitting) 1-28-25  - flexion -  30                    - extension -  35                        - right side bending -  25        - left side bending -  25      - right rotation - 45  - left rotation - 45      Cervical ROM: (measured in degrees sitting)  12-31-24  - flexion -  25                    - extension -  45                        - right side bending -  30        - left side bending -  30      - right rotation - 40  - left rotation - 30       Treatment     Sixto received the treatments listed below:      therapeutic exercises to develop strength, endurance, ROM, flexibility, posture, and core stabilization for 57 minutes including:    Cervical mob with side glide and P/A, grade 1-3, gentle distraction, suboccipital release  STM neck " paraspinals, upper trap, levator scapulae, suboccipitals     Instructed pt to continue  self STM to suboccipitals with tennis balls   Provided info on theracane for self STM   Further instructed pt regarding proper posture and concerns doing heavy work as a      CROM SB x 10 3 count hold  CROM rot x 10 3 count hold  after ROM ex increased 5 degrees SB and Rot 7  Scap squeeze x 10   Pulldown with retraction with YTB x 10  Retraction with YTB x 10  ER with YTB x 10   Protraction supine x 10 with 5#  did 5 at home and did well   Provided pt YTB for home use   Horizontal abd prone x 10 neutral      Ulnar/median Nerve gliding with tray exercise x 3/10      Patient  gave verbal consent to undergo dry needling.     Dry needling with trigger point/manual therapy techniques was performed to B upper trap,levator scap, and suboccipital.  All needles were removed and changes in signs and symptoms were decreased to 3/10 .  Dry needling was performed to decrease inflammation, increase circulation, decrease pain and restore homeostasis.       Cheko shoulder taping #1,2,3 was performed to provide support to scapula and shoulder to decrease pain and improve functional use of UE.  Instructed pt in use, care and precautions with tape.    Instructed pt in consideration of posture shirt and provided handout     Patient Education and Home Exercises     Home Exercises Provided and Patient Education Provided     Education provided:   - posture awareness and need for scap stab  - HEP   - stretch to point of tightness not pain   - exercise in pain free zone       Written Home Exercises Provided: yes. Exercises were reviewed and Sixto was able to demonstrate them prior to the end of the session.  Sixto demonstrated good understanding of the education provided. See EMR under Patient Instructions for exercises provided during therapy sessions    ASSESSMENT   Pt with less pain as compared to last session Pt may benefit from Cheko  taping and will note response  Pt tolerated treatment well and able to progress with strengthening and stabilization without adverse effect  and may benefit from neuroglides and use of theracane   Patient responded well to dry needling as noted above          Sixto Is progressing well towards his goals.     Pt prognosis is Good.   Rehab potential:  Good    Pt will continue to benefit from skilled outpatient physical therapy to address the goals listed in the initial evaluation, provide pt/family education and to maximize pt's level of independence in the home and community environment.     Pt's spiritual, cultural and educational needs considered and pt agreeable to plan of care and goals.     Anticipated barriers to physical therapy: chronicity of ankle    Short Term Goals: 3 weeks (Progressing, not met)  Demonstrate improvement in recent symptoms to progress toward long term goals  Correct postural deviations in sitting and standing to decrease pain and promote postural awareness for injury prevention.  Improve AROM of cervical spine to 50 deg rotation bilaterally, 50 deg extension, 50 deg flexion or better  50% decrease in radicular symptoms in upper extremities coming from the cervical spine  Demonstrate compliance with initial exercise program     Long Term Goals: 6 weeks (Progressing, not met)  Perform all dressing, grooming, and hygiene tasks without limitation from neck or arm pain  Perform 75% of work duties with minimal to no limitation due to neck or arm pain, no work task greater than moderate limitation  Perform all usual household and yard tasks with minimal to no limitation  Ascend/descend flight of stairs with no limitation, reciprocal pattern, minimal compensation due to left ankle  Ascend/descend ladder with minimal limitation due to left ankle  Demonstrate independence with home exercise program to maintain gains made in therapy.        PLAN   Continue with established plan of care towards PT  goals.      Progress strength and stab as tolerated      Brittany Campo, PT

## 2025-01-30 ENCOUNTER — CLINICAL SUPPORT (OUTPATIENT)
Dept: REHABILITATION | Facility: HOSPITAL | Age: 45
End: 2025-01-30
Attending: PHYSICAL THERAPIST
Payer: MEDICAID

## 2025-01-30 DIAGNOSIS — G89.29 CHRONIC BILATERAL LOW BACK PAIN WITHOUT SCIATICA: ICD-10-CM

## 2025-01-30 DIAGNOSIS — M54.2 NECK PAIN: ICD-10-CM

## 2025-01-30 DIAGNOSIS — M79.10 MYALGIA: Primary | ICD-10-CM

## 2025-01-30 DIAGNOSIS — M54.50 CHRONIC BILATERAL LOW BACK PAIN WITHOUT SCIATICA: ICD-10-CM

## 2025-01-30 DIAGNOSIS — M25.672 ANKLE STIFFNESS, LEFT: ICD-10-CM

## 2025-01-30 PROCEDURE — 97110 THERAPEUTIC EXERCISES: CPT | Mod: PN | Performed by: PHYSICAL THERAPIST

## 2025-01-30 NOTE — PATIENT INSTRUCTIONS
sourced from: Neural Mobilization: Examination & Intervention Strategies  Joshua Ordoñez, PT, DPT, OCS, Cert. MDT      Repeat motion 10-30 times  Perform 4 sets per day

## 2025-02-03 NOTE — PROGRESS NOTES
[facility]   Physical Therapy  Treatment Note     Name: Sixto Denis  Clinic Number: [id number]    Therapy Diagnosis:   Encounter Diagnosis   Name Primary?    Myalgia Yes     Physician: Jakub Barker MD    Visit Date: 2/4/2025    Physician Orders: PT Eval and Treat   Medical Diagnosis: M79.10 (ICD-10-CM) - Myalgia  Evaluation Date: 12/27/2024  Authorization period Expiration: 3-1/2025  Plan of Care Expiration: 3/14/2025  Progress Note Due: 2/27/2025  Visit #/Visits authorized: 5/ 13?   FOTO: 1/ 3         Precautions: Standard  Date of Surgery: NA    PTA Visit #: 0/5     Time In: 9:10  Time Out: 10:08  Total Billable Time: 58 minutes    SUBJECTIVE     Pt reports: Pt feeling sore in neck and R shoulder along upper trap Pt states he has been working in awkward positions and a lot of lifting Pt states when he loved the tape and when took tape off felt tingling   Response to previous treatment: felt ok   Functional change: work with tape less and less tingling     Pain: 5/10 to start after DN  at end 0/10 after neurofloss   Location: along upper trap R and neck B at IE bilateral upper trap     OBJECTIVE     Cervical ROM: (measured in degrees sitting) 1-28-25  - flexion -  30                    - extension -  35                        - right side bending -  25        - left side bending -  25      - right rotation - 45  - left rotation - 45      Cervical ROM: (measured in degrees sitting)  12-31-24  - flexion -  25                    - extension -  45                        - right side bending -  30        - left side bending -  30      - right rotation - 40  - left rotation - 30    Mod tightness R upper trap        Treatment     Sixto received the treatments listed below:      therapeutic exercises to develop strength, endurance, ROM, flexibility, posture, and core stabilization for 58 minutes including:    Cervical mob with side glide and P/A, grade 1-3, gentle distraction, suboccipital  release  STM neck paraspinals, upper trap, levator scapulae, suboccipitals     Using   self STM to suboccipitals with tennis balls   Pt to get theracane this week    Further instructed pt regarding proper posture and concerns doing heavy work as a      Protraction supine x 2/10 with 5#      CROM SB x 10 3 count hold  CROM rot x 10 3 count hold  after ROM ex increased 5 degrees SB and Rot 7  Scap squeeze x 10     Pulldown with retraction with YTB x 10  Retraction with YTB x 10  ER with YTB x 10      Horizontal abd prone x 2/10 neutral    Shoulder ext prone neutral x 10   ER SL x 10    Ulnar/median Nerve gliding with tray exercise x 3/10    Patient  gave verbal consent to undergo dry needling.     Dry needling with trigger point/manual therapy techniques was performed to B upper trap,levator scap, and suboccipital.  All needles were removed and changes in signs and symptoms were decreased to 3/10 .  Dry needling was performed to decrease inflammation, increase circulation, decrease pain and restore homeostasis.       Still shoulder taping #1,2,3 was performed to provide support to scapula and shoulder to decrease pain and improve functional use of UE.  Instructed pt in use, care and precautions with tape.    Instructed pt in consideration of posture shirt and provided handout     Patient Education and Home Exercises     Home Exercises Provided and Patient Education Provided     Education provided:   - posture awareness and need for scap stab  - HEP   - stretch to point of tightness not pain   - exercise in pain free zone       Written Home Exercises Provided: yes. Exercises were reviewed and Sixto was able to demonstrate them prior to the end of the session.  Sixto demonstrated good understanding of the education provided. See EMR under Patient Instructions for exercises provided during therapy sessions    ASSESSMENT   Pt with improved radicular symptoms with tape  Without tape tingling comes and goes Pt  tolerated treatment well and able to progress with strengthening and stabilization without adverse effect     Patient responded well to dry needling as noted above       Sixto Is progressing well towards his goals.     Pt prognosis is Good.   Rehab potential:  Good    Pt will continue to benefit from skilled outpatient physical therapy to address the goals listed in the initial evaluation, provide pt/family education and to maximize pt's level of independence in the home and community environment.     Pt's spiritual, cultural and educational needs considered and pt agreeable to plan of care and goals.     Anticipated barriers to physical therapy: chronicity of ankle    Short Term Goals: 3 weeks (Progressing, not met)  Demonstrate improvement in recent symptoms to progress toward long term goals  Correct postural deviations in sitting and standing to decrease pain and promote postural awareness for injury prevention.  Improve AROM of cervical spine to 50 deg rotation bilaterally, 50 deg extension, 50 deg flexion or better  50% decrease in radicular symptoms in upper extremities coming from the cervical spine  Demonstrate compliance with initial exercise program     Long Term Goals: 6 weeks (Progressing, not met)  Perform all dressing, grooming, and hygiene tasks without limitation from neck or arm pain  Perform 75% of work duties with minimal to no limitation due to neck or arm pain, no work task greater than moderate limitation  Perform all usual household and yard tasks with minimal to no limitation  Ascend/descend flight of stairs with no limitation, reciprocal pattern, minimal compensation due to left ankle  Ascend/descend ladder with minimal limitation due to left ankle  Demonstrate independence with home exercise program to maintain gains made in therapy.        PLAN   Continue with established plan of care towards PT goals.      Progress strength and stab as tolerated      Brittany Campo, PT

## 2025-02-04 ENCOUNTER — CLINICAL SUPPORT (OUTPATIENT)
Dept: REHABILITATION | Facility: HOSPITAL | Age: 45
End: 2025-02-04
Attending: PHYSICAL THERAPIST
Payer: MEDICAID

## 2025-02-04 DIAGNOSIS — M79.10 MYALGIA: Primary | ICD-10-CM

## 2025-02-04 PROCEDURE — 97110 THERAPEUTIC EXERCISES: CPT | Mod: PN | Performed by: PHYSICAL THERAPIST

## 2025-02-04 NOTE — PROGRESS NOTES
[facility]   Physical Therapy  Treatment Note     Name: Sixto Denis  Clinic Number: [id number]    Therapy Diagnosis:   Encounter Diagnosis   Name Primary?    Myalgia Yes     Physician: Jakub Barker MD    Visit Date: 2/6/2025    Physician Orders: PT Eval and Treat   Medical Diagnosis: M79.10 (ICD-10-CM) - Myalgia  Evaluation Date: 12/27/2024  Authorization period Expiration: 3-1/2025  Plan of Care Expiration: 3/14/2025  Progress Note Due: 2/27/2025  Visit #/Visits authorized: 5/ 13   FOTO: 1/ 3         Precautions: Standard  Date of Surgery: NA    PTA Visit #: 0/5     Time In: 9:10  Time Out: 10:03  Total Billable Time: 53 minutes    SUBJECTIVE     Pt reports: doing ok despite being very busy with work  Posture shirt ordered and using theracane Pt requested taping again today Pt states no tingling with tape  and no tingling so far since removed tape    Response to previous treatment: felt ok   Functional change: work with tape less and less tingling     Pain: 4-5/10 to start after DN  at end 0/10 after neurofloss   Location: along upper trap R and neck B at IE bilateral upper trap     OBJECTIVE     Cervical ROM: (measured in degrees sitting) 1-28-25  - flexion -  30                    - extension -  35                        - right side bending -  25        - left side bending -  25      - right rotation - 45  - left rotation - 45      Cervical ROM: (measured in degrees sitting)  12-31-24  - flexion -  25                    - extension -  45                        - right side bending -  30        - left side bending -  30      - right rotation - 40  - left rotation - 30    Mod tightness R upper trap        Treatment     Sixto received the treatments listed below:      therapeutic exercises to develop strength, endurance, ROM, flexibility, posture, and core stabilization for 53 minutes including:    Cervical mob with side glide and P/A, grade 1-3, gentle distraction, suboccipital  "release  STM neck paraspinals, upper trap, levator scapulae, suboccipitals     Using   self STM to suboccipitals with tennis balls   Pt got theracane and used last night and helped end of day pain  Pt still has posture shirt on order   Reiterate good posture     Protraction supine x 3/10 with 5#  next visit 8#     CROM SB x 10 3 count hold  CROM rot x 10 3 count hold  after ROM ex increased 5 degrees SB and Rot 7  Scap squeeze x 10     Pulldown with retraction with YTB x 15  Retraction with YTB x 10  ER with YTB x 10      Horizontal abd prone x 10 neutral fatigue at end   Shoulder ext prone neutral x 2/10 with 1#  ER SL x 10    Ulnar/median Nerve gliding with tray exercise x 3/10    Patient  gave verbal consent to undergo dry needling.     Dry needling with trigger point/manual therapy techniques was performed to B upper trap,levator scap, and suboccipital.  All needles were removed and changes in signs and symptoms were decreased to 0/10 .  Dry needling was performed to decrease inflammation, increase circulation, decrease pain and restore homeostasis.       HOld due to skin irritation  Still shoulder taping #1,2,3 was performed to provide support to scapula and shoulder to decrease pain and improve functional use of UE.  Instructed pt in use, care and precautions with tape.      Kinesiotape to inhibit upper trap with 8" I strip. 8" Y strip to inhibit deltoid and 2 6" X strips to stimulate rhomboids was performed to decrease pain and improve functional use of B UE.  Instructed pt in use, care and precautions with tape.    Instructed pt in consideration of posture shirt and provided handout     Patient Education and Home Exercises     Home Exercises Provided and Patient Education Provided     Education provided:   - posture awareness and need for scap stab  - HEP   - stretch to point of tightness not pain   - exercise in pain free zone       Written Home Exercises Provided: continue prior HEP. Exercises were " reviewed and Sixto was able to demonstrate them prior to the end of the session.  Sixto demonstrated good understanding of the education provided. See EMR under Patient Instructions for exercises provided during therapy sessions    ASSESSMENT   Pt has not been experiencing tingling in hands since previous session  Pt with no symptoms this AM in hands.  Pt tolerated treatment well and able to progress with strengthening and stabilization with increased resistance and reps without adverse effect Patient responded well to dry needling as noted above      Sixto Is progressing well towards his goals.     Pt prognosis is Good.   Rehab potential:  Good    Pt will continue to benefit from skilled outpatient physical therapy to address the goals listed in the initial evaluation, provide pt/family education and to maximize pt's level of independence in the home and community environment.     Pt's spiritual, cultural and educational needs considered and pt agreeable to plan of care and goals.     Anticipated barriers to physical therapy: chronicity of ankle    Short Term Goals: 3 weeks (Progressing, not met)  Demonstrate improvement in recent symptoms to progress toward long term goals  Correct postural deviations in sitting and standing to decrease pain and promote postural awareness for injury prevention.  Improve AROM of cervical spine to 50 deg rotation bilaterally, 50 deg extension, 50 deg flexion or better  50% decrease in radicular symptoms in upper extremities coming from the cervical spine  Demonstrate compliance with initial exercise program     Long Term Goals: 6 weeks (Progressing, not met)  Perform all dressing, grooming, and hygiene tasks without limitation from neck or arm pain  Perform 75% of work duties with minimal to no limitation due to neck or arm pain, no work task greater than moderate limitation  Perform all usual household and yard tasks with minimal to no limitation  Ascend/descend flight of stairs  with no limitation, reciprocal pattern, minimal compensation due to left ankle  Ascend/descend ladder with minimal limitation due to left ankle  Demonstrate independence with home exercise program to maintain gains made in therapy.        PLAN   Continue with established plan of care towards PT goals.      Progress strength and stab as tolerated      Brittany Campo, PT

## 2025-02-06 ENCOUNTER — CLINICAL SUPPORT (OUTPATIENT)
Dept: REHABILITATION | Facility: HOSPITAL | Age: 45
End: 2025-02-06
Attending: PHYSICAL THERAPIST
Payer: MEDICAID

## 2025-02-06 DIAGNOSIS — M79.10 MYALGIA: Primary | ICD-10-CM

## 2025-02-06 PROCEDURE — 97110 THERAPEUTIC EXERCISES: CPT | Mod: PN | Performed by: PHYSICAL THERAPIST

## 2025-02-18 ENCOUNTER — TELEPHONE (OUTPATIENT)
Dept: REHABILITATION | Facility: HOSPITAL | Age: 45
End: 2025-02-18
Payer: MEDICAID

## 2025-02-21 ENCOUNTER — DOCUMENTATION ONLY (OUTPATIENT)
Dept: REHABILITATION | Facility: HOSPITAL | Age: 45
End: 2025-02-21
Payer: MEDICAID

## 2025-02-22 NOTE — PROGRESS NOTES
[facility]  Physical Therapy Discharge Note    Name: Sixto Denis  Clinic Number: [id number]    Therapy Diagnosis: myalgia  Physician: Jakub Barker MD     Physician Orders: PT Eval and Treat   Medical Diagnosis: M79.10 (ICD-10-CM) - Myalgia  Evaluation Date: 12/27/2024      Date of Last visit: 2-6-25  Total Visits Received: 5    ASSESSMENT      Pt has not been experiencing tingling in hands since previous session  Pt with no symptoms this AM in hands.  Pt tolerated treatment well and able to progress with strengthening and stabilization with increased resistance and reps without adverse effect Patient responded well to dry needling as noted above      Discharge reason: Patient has not attended therapy since 2-6-25    Discharge FOTO Score: 47    Short Term Goals: 3 weeks (Progressing, not met)  Demonstrate improvement in recent symptoms to progress toward long term goals  Correct postural deviations in sitting and standing to decrease pain and promote postural awareness for injury prevention.  Improve AROM of cervical spine to 50 deg rotation bilaterally, 50 deg extension, 50 deg flexion or better  50% decrease in radicular symptoms in upper extremities coming from the cervical spine  Demonstrate compliance with initial exercise program     Long Term Goals: 6 weeks (Progressing, not met)  Perform all dressing, grooming, and hygiene tasks without limitation from neck or arm pain  Perform 75% of work duties with minimal to no limitation due to neck or arm pain, no work task greater than moderate limitation  Perform all usual household and yard tasks with minimal to no limitation  Ascend/descend flight of stairs with no limitation, reciprocal pattern, minimal compensation due to left ankle  Ascend/descend ladder with minimal limitation due to left ankle  Demonstrate independence with home exercise program to maintain gains made in therapy.    PLAN   This patient is discharged from  Physical Therapy.        Brittany Campo, PT

## 2025-03-28 ENCOUNTER — TELEPHONE (OUTPATIENT)
Dept: PAIN MEDICINE | Facility: CLINIC | Age: 45
End: 2025-03-28
Payer: MEDICAID

## 2025-03-28 NOTE — TELEPHONE ENCOUNTER
----- Message from Chaya sent at 3/28/2025 12:14 PM CDT -----  Regarding: appointment  Contact: patient  Type:  Sooner Appointment RequestCaller is requesting a sooner appointment.  Caller declined first available appointment listed below.  Caller will not accept being placed on the waitlist and is requesting a message be sent to doctor.Name of Caller:  Dr. Barker's officeWhen is the first available appointment?  Symptoms:  myalgia cervicalWould the patient rather a call back or a response via [facility]? callBest Call Back Number:  [phone] (home) Additional Information:  Please call patient to advise.  Thanks!

## 2025-08-01 ENCOUNTER — HOSPITAL ENCOUNTER (OUTPATIENT)
Dept: RADIOLOGY | Facility: HOSPITAL | Age: 45
Discharge: HOME | End: 2025-08-01
Attending: ANESTHESIOLOGY
Payer: MEDICAID

## 2025-08-01 ENCOUNTER — OFFICE VISIT (OUTPATIENT)
Dept: PAIN MEDICINE | Facility: CLINIC | Age: 45
End: 2025-08-01
Payer: MEDICAID

## 2025-08-01 VITALS
WEIGHT: 293 LBS | HEART RATE: 82 BPM | BODY MASS INDEX: 38.83 KG/M2 | HEIGHT: 73 IN | DIASTOLIC BLOOD PRESSURE: 70 MMHG | SYSTOLIC BLOOD PRESSURE: 120 MMHG

## 2025-08-01 DIAGNOSIS — M54.9 DORSALGIA, UNSPECIFIED: ICD-10-CM

## 2025-08-01 DIAGNOSIS — M54.16 LUMBAR RADICULOPATHY: ICD-10-CM

## 2025-08-01 DIAGNOSIS — M54.2 CERVICALGIA: Primary | ICD-10-CM

## 2025-08-01 DIAGNOSIS — M54.2 CERVICALGIA: ICD-10-CM

## 2025-08-01 PROCEDURE — 99999 PR PBB SHADOW E&M-EST. PATIENT-LVL IV: CPT | Mod: PBBFAC,,, | Performed by: ANESTHESIOLOGY

## 2025-08-01 PROCEDURE — 72052 X-RAY EXAM NECK SPINE 6/>VWS: CPT | Mod: 26,,, | Performed by: RADIOLOGY

## 2025-08-01 PROCEDURE — 3074F SYST BP LT 130 MM HG: CPT | Mod: CPTII,,, | Performed by: ANESTHESIOLOGY

## 2025-08-01 PROCEDURE — 99204 OFFICE O/P NEW MOD 45 MIN: CPT | Mod: S$PBB,,, | Performed by: ANESTHESIOLOGY

## 2025-08-01 PROCEDURE — 3044F HG A1C LEVEL LT 7.0%: CPT | Mod: CPTII,,, | Performed by: ANESTHESIOLOGY

## 2025-08-01 PROCEDURE — 99214 OFFICE O/P EST MOD 30 MIN: CPT | Mod: PBBFAC,25,PO | Performed by: ANESTHESIOLOGY

## 2025-08-01 PROCEDURE — 72114 X-RAY EXAM L-S SPINE BENDING: CPT | Mod: 26,,, | Performed by: RADIOLOGY

## 2025-08-01 PROCEDURE — 72114 X-RAY EXAM L-S SPINE BENDING: CPT | Mod: TC,PO

## 2025-08-01 PROCEDURE — 72052 X-RAY EXAM NECK SPINE 6/>VWS: CPT | Mod: TC,PO

## 2025-08-01 PROCEDURE — 3078F DIAST BP <80 MM HG: CPT | Mod: CPTII,,, | Performed by: ANESTHESIOLOGY

## 2025-08-01 PROCEDURE — 3008F BODY MASS INDEX DOCD: CPT | Mod: CPTII,,, | Performed by: ANESTHESIOLOGY

## 2025-08-01 NOTE — PROGRESS NOTES
[facility] New Patient Evaluation      Referred by: Dr. Jakub Barker    PCP:     CC:   Chief Complaint   Patient presents with    Pain    Low-back Pain    Mid-back Pain    Neck Pain    Leg Pain          8/1/2025     1:05 PM   Last 3 PDI Scores   Pain Disability Index (PDI) 42         HPI:   Sixto Denis is a 45 y.o. male patient who has a past medical history of Bulging lumbar disc, Bulging of cervical intervertebral disc, Chest pain, History of colonic polyps, History of kidney stones, HTN (hypertension), Kidney stones, Leg pain, Pounding heartbeat, and Syncope and collapse. He presents for evaluation of neck and lower back pain. He has constant lower back pain radiating down his leg, persisting regardless of position or activity. Neck pain is described as severe pressure on the shoulders.    Approximately 1.5 to 2 years ago, he experienced a work-related incident with sudden, severe back pain that left him unable to stand or move. He consulted a chiropractor who performed imaging and informed him of a shift in his spine, describing it as a significant misalignment.    About 8 years ago, he fell 16 feet onto concrete, initially self-treating with an Ace bandage. After 3 days, he sought medical attention when he noticed his ankle was discolored. Multiple orthopedic surgeons were consulted, with only one willing to consider surgery. He was informed that his tibia and fibula were broken, and his ankle had sustained significant damage.    Imaging of his neck about a year ago in June 2024 showed bulging discs between C5-C6 and C6-C7. He was previously seeing Dr. Desouza, who referred him to the current physician. He had been undergoing PT, including dry needling for his neck, which he reports as highly effective and very helpful despite his fear of needles.    He denies any medical diagnoses.    PREVIOUS TREATMENTS:  Patient underwent physical therapy for neck and lower back, which included dry needling on  "the neck. This treatment provided significant benefit to the patient.      Pain Intervention History:      Past Spine Surgical History:      Past and current medications:  Antineuropathics: gabapentin   NSAIDs:  Physical therapy: yes, completed   Antidepressants:  Muscle relaxers: robaxin  Opioids: hydrocodone   Antiplatelets/Anticoagulants: aspirin    History:  Current Medications[1]    Past Medical History:   Diagnosis Date    Bulging lumbar disc     Bulging of cervical intervertebral disc     Chest pain     History of colonic polyps     History of kidney stones     HTN (hypertension)     Kidney stones     Leg pain 2009    Shattered left  ankle and leg    Pounding heartbeat     Syncope and collapse        Past Surgical History:   Procedure Laterality Date    COLONOSCOPY W/ BIOPSIES AND POLYPECTOMY  11/2020       Family History   Problem Relation Name Age of Onset    Cancer Mother      Pancreatic cancer Mother      Cancer Father      Prostate cancer Father      Cancer Sister      Breast cancer Sister      Cancer Paternal Grandfather          colon       Social History     Socioeconomic History    Marital status: Single   Occupational History    Occupation:    Tobacco Use    Smoking status: Every Day     Types: Cigars    Smokeless tobacco: Never    Tobacco comments:     5 per day   Vaping Use    Vaping status: Former   Substance and Sexual Activity    Alcohol use: Never    Drug use: Yes     Types: Marijuana     Comment: Medical,  smokes       Review of patient's allergies indicates:  No Known Allergies    Review of Systems:  12 point review of systems is negative.    Physical Exam:  Vitals:    08/01/25 1306   BP: 120/70   Pulse: 82   Weight: 132.9 kg (292 lb 15.9 oz)   Height: 6' 1" (1.854 m)   PainSc:   7   PainLoc: Back     Body mass index is 38.66 kg/m².    Gen: NAD  Psych: mood appropriate for given condition  HEENT: eyes anicteric   CV: RRR  HEENT: anicteric   Respiratory: non-labored, no signs of " respiratory distress  Abd: non-distended  Skin: warm, dry and intact.  Gait: No antalgic gait.     Reproducible pain with cervical axial facet loading    Sensory:  Intact and symmetrical to light touch in C4-T1 dermatomes bilaterally.   Intact and symmetrical to light touch in L1-S1 dermatomes bilaterally.    Motor:    Right Left   C4 Shoulder Abduction  5  5   C5 Elbow Flexion    5  5   C6 Wrist Extension  5  5   C7 Elbow Extension   5  5   C8/T1 Hand Intrinsics   5  5        Right Left   L2/3 Iliacus Hip flexion  5  5   L3/4 Qudratus Femoris Knee Extension  5  5   L4/5 Tib Anterior Ankle Dorsiflexion   5  5   L5/S1 Extensor Hallicus Longus Great toe extension  5  5   S1/S2 Gastroc/Soleus Plantar Flexion  5  5      Right Left   Triceps DTR 2+ 2+   Biceps DTR 2+ 2+        Patellar DTR 2+ 2+   Achilles DTR 2+ 2+   Red Absent  Absent                 Labs:  Lab Results   Component Value Date    HGBA1C 5.8 (H) 03/28/2025       Lab Results   Component Value Date    WBC 11.30 07/22/2024    HGB 15.7 07/22/2024    HCT 45.7 07/22/2024    MCV 91 07/22/2024     07/22/2024           Imaging:  MRI cervical spine 6/20/24  FINDINGS:  The exam is limited by motion artifact on multiple sequences, despite repeated attempts at imaging.  There is straightening of the normal cervical spinal curvature, with normal vertebral body heights and alignment, and no acute fractures, destructive osseous lesions, or findings of a diffuse marrow replacement process.  Degenerative loss of disc height and signal involves multiple levels.  The visualized posterior fossa, cervicomedullary junction and cervical cord have normal signal.  There is T2 hyperintense sphenoid sinus mucosal thickening.     At C2-C3, C3-C4, and C4-C5, there is no significant disc bulging or focal disc herniation, with no spinal canal stenosis or neural foraminal narrowing.  At C5-C6, there is a broad-based disc protrusion with osteophytic ridging, which indents the  ventral thecal sac and produces moderate spinal canal stenosis.  There is moderate left and severe right neural foraminal narrowing.  At C6-C7, there is a broad-based disc protrusion with osteophytic ridging, which indents the ventral thecal sac and produces moderate spinal canal stenosis, without findings of cord edema or myelomalacia.  There is moderate bilateral foraminal narrowing.  At C7-T1, there is no significant abnormality.     There are no gross signal abnormalities of the paraspinal musculature or the paraspinal soft tissues.  No enlarged cervical chain lymph nodes are identified.    Diagnosis:  1. Cervicalgia  -     X-Ray Cervical Spine 5 View With Flex And Ext; Future; Expected date: 08/01/2025    2. Dorsalgia, unspecified  -     MRI Lumbar Spine Without Contrast; Future; Expected date: 08/01/2025  -     X-Ray Lumbar Complete Including Flex And Ext; Future; Expected date: 08/01/2025    3. Lumbar radiculopathy  -     Ambulatory Referral/Consult to Physical Therapy          Sixto Denis is a 45 y.o. male patient who has a past medical history of Bulging lumbar disc, Bulging of cervical intervertebral disc, Chest pain, History of colonic polyps, History of kidney stones, HTN (hypertension), Kidney stones, Leg pain, Pounding heartbeat, and Syncope and collapse. He presents with neck and back pain.       - on exam he has full strength in his upper and lower extremities.  Intact sensation to light touch bilateral C4-T1, L2-S1.  Reproducible pain with cervical axial facet loading  - I independently reviewed his cervical MRI and at C5-6 he has a disc protrusion causing moderate central canal narrowing and moderate left and severe right foraminal narrowing.  At C6-7 he has a broad-based disc protrusion causing moderate central canal narrowing and moderate bilateral foraminal narrowing.  There is no cord impingement or cord signal changes  - over the past 9 months he has completed formal physical therapy and does  his best to maintain PT directed home exercise program as well as uses gabapentin and Robaxin as well as hydrocodone on as needed basis for severe breakthrough pain  - he reports he continues to have neck pain as well as lower back pain that radiates down his left leg that limits his mobility and interfering with the quality of his life.  He would like to address his lower back pain and leg pain for  - I am going to get updated x-rays of his cervical and lumbar spine to assess his bony anatomy and rule out any instability.  I am also going to order an MRI of his lumbar spine to better evaluate his neural anatomy  - I have placed a referral for him to restart formal PT as he did get some benefit specifically from dry needling.  We will call him once his imaging is complete to discuss further treatment recommendations      : Dannie Skaggs M.D.  Interventional Pain Medicine / Anesthesiology    This note was completed with dictation software and grammatical errors may exist         [1]   Current Outpatient Medications:     ALPRAZolam (XANAX) 0.5 MG tablet, Take 1 tablet (0.5 mg total) by mouth 2 (two) times daily as needed for Anxiety., Disp: 60 tablet, Rfl: 0    aspirin (ECOTRIN) 81 MG EC tablet, Take 1 tablet (81 mg total) by mouth once daily., Disp: 90 tablet, Rfl: 3    busPIRone (BUSPAR) 10 MG tablet, Take 1 tablet (10 mg total) by mouth 2 (two) times daily., Disp: 180 tablet, Rfl: 3    gabapentin (NEURONTIN) 300 MG capsule, Take 1 capsule (300 mg total) by mouth Daily., Disp: 90 capsule, Rfl: 3    HYDROcodone-acetaminophen (NORCO)  mg per tablet, Take 1 tablet by mouth every 12 (twelve) hours as needed for Pain., Disp: 60 tablet, Rfl: 0    methocarbamoL (ROBAXIN) 500 MG Tab, Take 2 tablets (1,000 mg total) by mouth 4 (four) times daily., Disp: 150 tablet, Rfl: 6    mometasone 0.1% (ELOCON) 0.1 % cream, Apply topically once daily. (Patient taking differently: Apply topically daily as needed.),  Disp: 45 g, Rfl: 1    ondansetron (ZOFRAN-ODT) 4 MG TbDL, Take 1 tablet (4 mg total) by mouth every 8 (eight) hours as needed (nausea)., Disp: 12 tablet, Rfl: 3    tamsulosin (FLOMAX) 0.4 mg Cap, Take 1 capsule (0.4 mg total) by mouth once daily., Disp: 90 capsule, Rfl: 3    hydrocortisone (WESTCORT) 0.2 % cream, Apply to affected area 2 times daily, Disp: 60 g, Rfl: 1

## 2025-08-04 PROBLEM — R07.2 SUBSTERNAL CHEST PAIN: Status: ACTIVE | Noted: 2025-08-04

## 2025-08-06 ENCOUNTER — TELEPHONE (OUTPATIENT)
Dept: PAIN MEDICINE | Facility: CLINIC | Age: 45
End: 2025-08-06
Payer: MEDICAID

## 2025-08-06 NOTE — TELEPHONE ENCOUNTER
Spoke with patient to discuss physical therapy and he said he started physical therapy 1/1/25 and did 6 weeks going to 3/15/25 and completed 6 visits. He then began physical therapy again on 3/15/25 and finished 4/30/25 completing 8 visits. He had good relief in his neck, but his back and leg pain is still there at a 7/10

## 2025-08-07 ENCOUNTER — HOSPITAL ENCOUNTER (OUTPATIENT)
Dept: RADIOLOGY | Facility: HOSPITAL | Age: 45
Discharge: HOME | End: 2025-08-07
Attending: ANESTHESIOLOGY
Payer: MEDICAID

## 2025-08-07 ENCOUNTER — CLINICAL SUPPORT (OUTPATIENT)
Dept: REHABILITATION | Facility: HOSPITAL | Age: 45
End: 2025-08-07
Attending: ANESTHESIOLOGY
Payer: MEDICAID

## 2025-08-07 DIAGNOSIS — M54.16 LUMBAR RADICULOPATHY: Primary | ICD-10-CM

## 2025-08-07 PROCEDURE — 97110 THERAPEUTIC EXERCISES: CPT | Mod: PN

## 2025-08-07 PROCEDURE — 97161 PT EVAL LOW COMPLEX 20 MIN: CPT | Mod: PN

## 2025-08-07 NOTE — PROGRESS NOTES
Outpatient Rehab    Physical Therapy Evaluation    Patient Name: Sixto Denis  MRN: [id number]  YOB: 1980  Encounter Date: 8/7/2025    Therapy Diagnosis: No diagnosis found.  Physician: Richard Skaggs MD    Physician Orders: Eval and Treat  Medical Diagnosis: Lumbar radiculopathy  Surgical Diagnosis: Not applicable for this Episode   Surgical Date: Not applicable for this Episode  Days Since Last Surgery: Not applicable for this Episode    Visit # / Visits Authorized:  1 / 1  Insurance Authorization Period: 8/1/2025 to 8/1/2026  Date of Evaluation: 8/7/2025  Plan of Care Certification: 8/7/2025 to 10/2/25     Time In: 1300   Time Out: 1400  Total Time (in minutes): 60   Total Billable Time (in minutes): 60    Intake Outcome Measure for FOTO Survey    Therapist reviewed FOTO scores for Sixto Denis on 8/7/2025.   FOTO report - see Media section or FOTO account episode details.     Intake Score (%): Not applicable for this Episode    Precautions:  Additional Precautions and Protocol Details: standard    Subjective   History of Present Illness  Sixto is a 45 y.o. male who reports to physical therapy with a chief concern of pain to low back and down posterior L LE to ankle.     The patient reports a medical diagnosis of Lumbar radiculopathy.            Diagnostic tests related to this condition: X-ray.     X-Ray Details: 1. There is grade 1-2 spondylolisthesis with bilateral spondylolysis at the L5-S1 level where there is also moderate to marked disc space narrowing in addition to facet joint arthropathy.  2. There is no fracture.  3. There is mild retrolisthesis of L2 on L3 where there is also disc space narrowing    History of Present Condition/Illness: Pt states he fell 16ft 8 years ago.  He had pain to L foot/ankle and had xrays.  He had a shattered ankle, fx tibia and fibula.  They did not do imaging of his spine at the time.  If he is climbing ladder and doing anything too strenuous he will have  "back pain and then down L LE. He went to chiropractor 2 years ago due to pinch in low back.  The chiropractor "yanked on his L ankle" and he started having a flare up of pain to neck and back.  Pt is active as a  (ladders, in roof, digging).  He has increased pain when it rains.  His low back pain is constant with intermittent posterior L LE pain to ankle.  He has a difficult time finding a position for pain relief.  The MD prescribed pain medicine and Gabapentin.  He takes it before bed to be able to sleep, but he still wakes up with pain.  When he takes Zantex for anxiety it helps decreased low back and L LE pain.         Activities of Daily Living      General Prior Level of Function Comments: I with ADLs  General Current Level of Function Comments: pain and difficutly with sit to stand, lifting, fwd bending  Patient Roles: Caregiver for adult  Patient Responsibilities: Health management, Home management, Laundry, Meal prep, Personal ADL    Previously independent with activities of daily living? Yes     Currently independent with activities of daily living? Yes          Previously independent with instrumental activities of daily living? Yes     Currently independent with instrumental activities of daily living? Yes              Pain     Patient reports a current pain level of 6/10. Pain at best is reported as 5/10. Pain at worst is reported as 10/10.   Location: pain to central low back to L side and then down posterior L LE to ankle (numbness/tingling to L LE)  Pain Qualities: Other (Comment), Burning  Other Pain Qualities: stabbing  Pain-Relieving Factors: Movement, Change in position  Pain-Aggravating Factors: Bending, Lifting, Stair climbing, Squatting         Review of Systems  Patient reports: Trauma History  Patient denies: Bladder Incontinence, Bowel Incontinence, Saddle Numbness, and Cancer History        Living Arrangements  Living Situation  Housing: Home independently    Home Setup  Type of " Structure: House        Employment     Employment Status: Employed full-time        Past Medical History/Physical Systems Review:   Sixto Denis  has a past medical history of Bulging lumbar disc, Bulging of cervical intervertebral disc, Chest pain, History of colonic polyps, History of kidney stones, HTN (hypertension), Kidney stones, Leg pain, Pounding heartbeat, and Syncope and collapse.    Sixto Denis  has a past surgical history that includes Colonoscopy w/ biopsies and polypectomy (11/2020) and ANGIOGRAM, CORONARY, WITH LEFT HEART CATHETERIZATION (8/4/2025).    Sixto has a current medication list which includes the following prescription(s): alprazolam, aspirin, buspirone, gabapentin, hydrocodone-acetaminophen, hydrocortisone, methocarbamol, mometasone 0.1%, ondansetron, and tamsulosin.    Review of patient's allergies indicates:  No Known Allergies     Objective   Posture  Patient presents with a Forward head position.     Shoulders are Rounded.             Right Lower Extremity Reflexes  Patellar, L4: Normal (2+)                   Left Lower Extremity Reflexes  Patellar, L4: Normal (2+)                        Spinal Mobility  Hypomobile: Thoracic and Lumbosacral           Lumbar Range of Motion   Active (%) Passive (%) Pain   Flexion 90   Yes (pain down to L ankle)   Extension 25       Right Lateral Flexion 50       Right Rotation 50       Left Lateral Flexion 50       Left Rotation 25                           Hip Strength - Planes of Motion   Right Strength Right Pain Left Strength Left  Pain   Flexion (L2) 5   4+     Extension 4   3+     ABduction 4   3+ Yes   ADduction 4   3+     Internal Rotation           External Rotation               Knee Strength   Right Strength Right Pain Left Strength Left  Pain   Flexion (S2) 5   5     Prone Flexion           Extension (L3) 5   4            Ankle/Foot Strength - Planes of Motion   Right Strength Right Pain Left Strength Left  Pain   Dorsiflexion (L4) 5    5     Plantar Flexion (S1)           Inversion           Eversion           Great Toe Flexion           Great Toe Extension (L5)           Lesser Toes Flexion           Lesser Toes Extension                  Lumbar/Pelvic Girdle Special Tests  Lumbar Tests - Repeated  Positive: Flexion and Extension  centralization with repeated lumbar ext    Lumbar Tests - SLR and Tension  Positive: Right Passive Straight Leg Raise                          Treatment:  Therapeutic Exercise  TE 1: standing lumbar extension x10  TE 2: prone on elbows x2 min  Therapeutic Activity  TA 1: bridges x10, 5 sec hold  TA 2: pt edu on walking in chest deep water and floating vertically with intertube to allow LEs to hang for gentle traction    Time Entry(in minutes):  PT Evaluation (Low) Time Entry: 30  Therapeutic Exercise Time Entry: 30    Assessment & Plan        The patient's spiritual, cultural, and educational needs were considered, and the patient is agreeable to the plan of care and goals.     Education  Education was done with Patient. The patient's learning style includes Demonstration and Listening. The patient Demonstrates understanding and Verbalizes understanding.         -role of PT -Pt educated on importance of compliance with HEP and to perform exercises in pain free ROM  Pt gave verbal understanding to all education provided         Goals:   Active       long term goals       Patient will report decreased low back pain to 2/10 for increased ease with ADLs.        Start:  08/07/25    Expected End:  10/02/25            Patient will increase LE strength by 1 muscle grade in all deficient planes for increased ease with ADLs.        Start:  08/07/25    Expected End:  10/02/25            Pt will be independent with HEP for self management of symptoms        Start:  08/07/25    Expected End:  10/02/25            pt will demonstrate understanding of proper body mechanics for lifting and job duties       Start:  08/07/25    Expected  End:  10/02/25            pt will report ability to climb ladder without pain for increased ease with job tasks.       Start:  08/07/25    Expected End:  10/02/25               short term goals       Patient will report decreased low back pain to 4/10 for increased ease with ADLs.        Start:  08/07/25    Expected End:  09/04/25            pt will report centralization of symptoms to low back only for increased ease with ADLs       Start:  08/07/25    Expected End:  09/04/25            Patient will increase LE strength by 1/3 muscle grade in all deficient planes for increased ease with ADLs.        Start:  08/07/25    Expected End:  09/04/25            pt will report ability to sleep through the night without pain       Start:  08/07/25    Expected End:  09/04/25                Dasia Boss, PT, DPT

## 2025-08-21 ENCOUNTER — CLINICAL SUPPORT (OUTPATIENT)
Dept: REHABILITATION | Facility: HOSPITAL | Age: 45
End: 2025-08-21
Attending: ANESTHESIOLOGY
Payer: MEDICAID

## 2025-08-21 DIAGNOSIS — M54.16 LUMBAR RADICULOPATHY: Primary | ICD-10-CM

## 2025-08-21 PROCEDURE — 97110 THERAPEUTIC EXERCISES: CPT | Mod: PN,CQ

## 2025-08-28 ENCOUNTER — CLINICAL SUPPORT (OUTPATIENT)
Dept: REHABILITATION | Facility: HOSPITAL | Age: 45
End: 2025-08-28
Attending: ANESTHESIOLOGY
Payer: MEDICAID

## 2025-08-28 DIAGNOSIS — M54.16 LUMBAR RADICULOPATHY: Primary | ICD-10-CM

## 2025-08-28 PROCEDURE — 97110 THERAPEUTIC EXERCISES: CPT | Mod: PN

## 2025-09-02 ENCOUNTER — CLINICAL SUPPORT (OUTPATIENT)
Dept: REHABILITATION | Facility: HOSPITAL | Age: 45
End: 2025-09-02
Attending: ANESTHESIOLOGY
Payer: MEDICAID

## 2025-09-02 DIAGNOSIS — M54.16 LUMBAR RADICULOPATHY: Primary | ICD-10-CM

## 2025-09-02 PROCEDURE — 97110 THERAPEUTIC EXERCISES: CPT | Mod: PN
